# Patient Record
Sex: FEMALE | Race: BLACK OR AFRICAN AMERICAN | NOT HISPANIC OR LATINO | Employment: OTHER | URBAN - METROPOLITAN AREA
[De-identification: names, ages, dates, MRNs, and addresses within clinical notes are randomized per-mention and may not be internally consistent; named-entity substitution may affect disease eponyms.]

---

## 2019-06-04 ENCOUNTER — HISTORICAL (OUTPATIENT)
Dept: ADMINISTRATIVE | Facility: HOSPITAL | Age: 69
End: 2019-06-04

## 2019-06-04 LAB
ABS NEUT (OLG): 4.57 X10(3)/MCL (ref 2.1–9.2)
ALBUMIN SERPL-MCNC: 3.4 GM/DL (ref 3.4–5)
ALBUMIN/GLOB SERPL: 0.9 {RATIO}
ALP SERPL-CCNC: 124 UNIT/L (ref 38–126)
ALT SERPL-CCNC: 15 UNIT/L (ref 12–78)
AST SERPL-CCNC: 12 UNIT/L (ref 15–37)
BASOPHILS # BLD AUTO: 0.1 X10(3)/MCL (ref 0–0.2)
BASOPHILS NFR BLD AUTO: 1 %
BILIRUB SERPL-MCNC: 0.5 MG/DL (ref 0.2–1)
BILIRUBIN DIRECT+TOT PNL SERPL-MCNC: 0.1 MG/DL (ref 0–0.2)
BILIRUBIN DIRECT+TOT PNL SERPL-MCNC: 0.4 MG/DL (ref 0–0.8)
BUN SERPL-MCNC: 26 MG/DL (ref 7–18)
CALCIUM SERPL-MCNC: 9.5 MG/DL (ref 8.5–10.1)
CHLORIDE SERPL-SCNC: 110 MMOL/L (ref 98–107)
CHOLEST SERPL-MCNC: 126 MG/DL (ref 0–200)
CHOLEST/HDLC SERPL: 2.5 {RATIO} (ref 0–4)
CO2 SERPL-SCNC: 23 MMOL/L (ref 21–32)
CREAT SERPL-MCNC: 1.21 MG/DL (ref 0.55–1.02)
CREAT UR-MCNC: 88 MG/DL
EOSINOPHIL # BLD AUTO: 0.2 X10(3)/MCL (ref 0–0.9)
EOSINOPHIL NFR BLD AUTO: 2 %
ERYTHROCYTE [DISTWIDTH] IN BLOOD BY AUTOMATED COUNT: 13 % (ref 11.5–17)
EST. AVERAGE GLUCOSE BLD GHB EST-MCNC: 192 MG/DL
GLOBULIN SER-MCNC: 3.8 GM/DL (ref 2.4–3.5)
GLUCOSE SERPL-MCNC: 137 MG/DL (ref 74–106)
HBA1C MFR BLD: 8.3 % (ref 4.2–6.3)
HCT VFR BLD AUTO: 32.3 % (ref 37–47)
HDLC SERPL-MCNC: 50 MG/DL (ref 35–60)
HGB BLD-MCNC: 10.2 GM/DL (ref 12–16)
LDLC SERPL CALC-MCNC: 54 MG/DL (ref 0–129)
LYMPHOCYTES # BLD AUTO: 2.4 X10(3)/MCL (ref 0.6–4.6)
LYMPHOCYTES NFR BLD AUTO: 31 %
MCH RBC QN AUTO: 28.9 PG (ref 27–31)
MCHC RBC AUTO-ENTMCNC: 31.6 GM/DL (ref 33–36)
MCV RBC AUTO: 91.5 FL (ref 80–94)
MICROALBUMIN UR-MCNC: 3.6 MG/DL
MICROALBUMIN/CREAT RATIO PNL UR: 40.9 MG/GM CR (ref 0–30)
MONOCYTES # BLD AUTO: 0.5 X10(3)/MCL (ref 0.1–1.3)
MONOCYTES NFR BLD AUTO: 7 %
NEUTROPHILS # BLD AUTO: 4.57 X10(3)/MCL (ref 2.1–9.2)
NEUTROPHILS NFR BLD AUTO: 58 %
PLATELET # BLD AUTO: 236 X10(3)/MCL (ref 130–400)
PMV BLD AUTO: 9.7 FL (ref 9.4–12.4)
POTASSIUM SERPL-SCNC: 4.5 MMOL/L (ref 3.5–5.1)
PROT SERPL-MCNC: 7.2 GM/DL (ref 6.4–8.2)
RBC # BLD AUTO: 3.53 X10(6)/MCL (ref 4.2–5.4)
SODIUM SERPL-SCNC: 141 MMOL/L (ref 136–145)
TRIGL SERPL-MCNC: 111 MG/DL (ref 30–150)
TSH SERPL-ACNC: 1.17 MIU/L (ref 0.36–3.74)
VLDLC SERPL CALC-MCNC: 22 MG/DL
WBC # SPEC AUTO: 7.8 X10(3)/MCL (ref 4.5–11.5)

## 2019-09-04 ENCOUNTER — HISTORICAL (OUTPATIENT)
Dept: ADMINISTRATIVE | Facility: HOSPITAL | Age: 69
End: 2019-09-04

## 2019-09-04 LAB
ABS NEUT (OLG): 3.56 X10(3)/MCL (ref 2.1–9.2)
BASOPHILS # BLD AUTO: 0 X10(3)/MCL (ref 0–0.2)
BASOPHILS NFR BLD AUTO: 1 %
BUN SERPL-MCNC: 38 MG/DL (ref 7–18)
CALCIUM SERPL-MCNC: 9.6 MG/DL (ref 8.5–10.1)
CHLORIDE SERPL-SCNC: 109 MMOL/L (ref 98–107)
CO2 SERPL-SCNC: 29 MMOL/L (ref 21–32)
CREAT SERPL-MCNC: 1.5 MG/DL (ref 0.55–1.02)
CREAT/UREA NIT SERPL: 25.3
EOSINOPHIL # BLD AUTO: 0.1 X10(3)/MCL (ref 0–0.9)
EOSINOPHIL NFR BLD AUTO: 2 %
ERYTHROCYTE [DISTWIDTH] IN BLOOD BY AUTOMATED COUNT: 13.3 % (ref 11.5–17)
EST. AVERAGE GLUCOSE BLD GHB EST-MCNC: 148 MG/DL
FERRITIN SERPL-MCNC: 51.1 NG/ML (ref 8–388)
GLUCOSE SERPL-MCNC: 105 MG/DL (ref 74–106)
HBA1C MFR BLD: 6.8 % (ref 4.2–6.3)
HCT VFR BLD AUTO: 31.9 % (ref 37–47)
HGB BLD-MCNC: 9.7 GM/DL (ref 12–16)
IRON SATN MFR SERPL: 20.4 % (ref 20–50)
IRON SERPL-MCNC: 68 MCG/DL (ref 50–175)
LYMPHOCYTES # BLD AUTO: 2.3 X10(3)/MCL (ref 0.6–4.6)
LYMPHOCYTES NFR BLD AUTO: 35 %
MCH RBC QN AUTO: 29.8 PG (ref 27–31)
MCHC RBC AUTO-ENTMCNC: 30.4 GM/DL (ref 33–36)
MCV RBC AUTO: 98.2 FL (ref 80–94)
MONOCYTES # BLD AUTO: 0.6 X10(3)/MCL (ref 0.1–1.3)
MONOCYTES NFR BLD AUTO: 9 %
NEUTROPHILS # BLD AUTO: 3.56 X10(3)/MCL (ref 2.1–9.2)
NEUTROPHILS NFR BLD AUTO: 54 %
PLATELET # BLD AUTO: 255 X10(3)/MCL (ref 130–400)
PMV BLD AUTO: 9.8 FL (ref 9.4–12.4)
POTASSIUM SERPL-SCNC: 4.8 MMOL/L (ref 3.5–5.1)
RBC # BLD AUTO: 3.25 X10(6)/MCL (ref 4.2–5.4)
SODIUM SERPL-SCNC: 142 MMOL/L (ref 136–145)
TIBC SERPL-MCNC: 334 MCG/DL (ref 250–450)
TRANSFERRIN SERPL-MCNC: 241 MG/DL (ref 200–360)
VIT B12 SERPL-MCNC: 315 PG/ML (ref 193–986)
WBC # SPEC AUTO: 6.6 X10(3)/MCL (ref 4.5–11.5)

## 2019-09-27 ENCOUNTER — HISTORICAL (OUTPATIENT)
Dept: RADIOLOGY | Facility: HOSPITAL | Age: 69
End: 2019-09-27

## 2019-12-04 ENCOUNTER — HISTORICAL (OUTPATIENT)
Dept: ADMINISTRATIVE | Facility: HOSPITAL | Age: 69
End: 2019-12-04

## 2019-12-04 LAB
ABS NEUT (OLG): 4.21 X10(3)/MCL (ref 2.1–9.2)
BASOPHILS # BLD AUTO: 0.1 X10(3)/MCL (ref 0–0.2)
BASOPHILS NFR BLD AUTO: 1 %
BUN SERPL-MCNC: 30 MG/DL (ref 7–18)
CALCIUM SERPL-MCNC: 9.6 MG/DL (ref 8.5–10.1)
CHLORIDE SERPL-SCNC: 108 MMOL/L (ref 98–107)
CO2 SERPL-SCNC: 27 MMOL/L (ref 21–32)
CREAT SERPL-MCNC: 1.19 MG/DL (ref 0.55–1.02)
CREAT/UREA NIT SERPL: 25.2
DEPRECATED CALCIDIOL+CALCIFEROL SERPL-MC: 12.14 NG/ML (ref 30–80)
EOSINOPHIL # BLD AUTO: 0.2 X10(3)/MCL (ref 0–0.9)
EOSINOPHIL NFR BLD AUTO: 3 %
ERYTHROCYTE [DISTWIDTH] IN BLOOD BY AUTOMATED COUNT: 12.2 % (ref 11.5–17)
GLUCOSE SERPL-MCNC: 109 MG/DL (ref 74–106)
HCT VFR BLD AUTO: 35.8 % (ref 37–47)
HGB BLD-MCNC: 10.9 GM/DL (ref 12–16)
LYMPHOCYTES # BLD AUTO: 2.3 X10(3)/MCL (ref 0.6–4.6)
LYMPHOCYTES NFR BLD AUTO: 31 %
MCH RBC QN AUTO: 28.9 PG (ref 27–31)
MCHC RBC AUTO-ENTMCNC: 30.4 GM/DL (ref 33–36)
MCV RBC AUTO: 95 FL (ref 80–94)
MONOCYTES # BLD AUTO: 0.6 X10(3)/MCL (ref 0.1–1.3)
MONOCYTES NFR BLD AUTO: 8 %
NEUTROPHILS # BLD AUTO: 4.21 X10(3)/MCL (ref 2.1–9.2)
NEUTROPHILS NFR BLD AUTO: 57 %
PLATELET # BLD AUTO: 269 X10(3)/MCL (ref 130–400)
PMV BLD AUTO: 9.9 FL (ref 9.4–12.4)
POTASSIUM SERPL-SCNC: 5.2 MMOL/L (ref 3.5–5.1)
RBC # BLD AUTO: 3.77 X10(6)/MCL (ref 4.2–5.4)
SODIUM SERPL-SCNC: 139 MMOL/L (ref 136–145)
WBC # SPEC AUTO: 7.4 X10(3)/MCL (ref 4.5–11.5)

## 2020-01-23 ENCOUNTER — HISTORICAL (OUTPATIENT)
Dept: ADMINISTRATIVE | Facility: HOSPITAL | Age: 70
End: 2020-01-23

## 2020-06-03 ENCOUNTER — HISTORICAL (OUTPATIENT)
Dept: ADMINISTRATIVE | Facility: HOSPITAL | Age: 70
End: 2020-06-03

## 2020-06-03 LAB
ABS NEUT (OLG): 3.58 X10(3)/MCL (ref 2.1–9.2)
ALBUMIN SERPL-MCNC: 3.6 GM/DL (ref 3.4–4.8)
ALBUMIN/GLOB SERPL: 1.1 RATIO (ref 1.1–2)
ALP SERPL-CCNC: 102 UNIT/L (ref 40–150)
ALT SERPL-CCNC: 12 UNIT/L (ref 0–55)
AST SERPL-CCNC: 13 UNIT/L (ref 5–34)
BASOPHILS # BLD AUTO: 0.1 X10(3)/MCL (ref 0–0.2)
BASOPHILS NFR BLD AUTO: 1 %
BILIRUB SERPL-MCNC: 0.5 MG/DL
BILIRUBIN DIRECT+TOT PNL SERPL-MCNC: 0.2 MG/DL (ref 0–0.5)
BILIRUBIN DIRECT+TOT PNL SERPL-MCNC: 0.3 MG/DL (ref 0–0.8)
BUN SERPL-MCNC: 20 MG/DL (ref 9.8–20.1)
CALCIUM SERPL-MCNC: 9.6 MG/DL (ref 8.4–10.2)
CHLORIDE SERPL-SCNC: 109 MMOL/L (ref 98–107)
CHOLEST SERPL-MCNC: 150 MG/DL
CHOLEST/HDLC SERPL: 3 {RATIO} (ref 0–5)
CO2 SERPL-SCNC: 26 MMOL/L (ref 23–31)
CREAT SERPL-MCNC: 1.16 MG/DL (ref 0.55–1.02)
CREAT UR-MCNC: 97.8 MG/DL (ref 45–106)
DEPRECATED CALCIDIOL+CALCIFEROL SERPL-MC: 61.4 NG/ML (ref 6.6–49.9)
EOSINOPHIL # BLD AUTO: 0.2 X10(3)/MCL (ref 0–0.9)
EOSINOPHIL NFR BLD AUTO: 2 %
ERYTHROCYTE [DISTWIDTH] IN BLOOD BY AUTOMATED COUNT: 12.2 % (ref 11.5–17)
EST. AVERAGE GLUCOSE BLD GHB EST-MCNC: 174.3 MG/DL
GLOBULIN SER-MCNC: 3.4 GM/DL (ref 2.4–3.5)
GLUCOSE SERPL-MCNC: 123 MG/DL (ref 82–115)
HBA1C MFR BLD: 7.7 %
HCT VFR BLD AUTO: 36.5 % (ref 37–47)
HDLC SERPL-MCNC: 44 MG/DL (ref 35–60)
HGB BLD-MCNC: 11.2 GM/DL (ref 12–16)
LDLC SERPL CALC-MCNC: 87 MG/DL (ref 50–140)
LYMPHOCYTES # BLD AUTO: 2.6 X10(3)/MCL (ref 0.6–4.6)
LYMPHOCYTES NFR BLD AUTO: 37 %
MCH RBC QN AUTO: 29.2 PG (ref 27–31)
MCHC RBC AUTO-ENTMCNC: 30.7 GM/DL (ref 33–36)
MCV RBC AUTO: 95.1 FL (ref 80–94)
MICROALBUMIN UR-MCNC: 51.5 UG/ML
MICROALBUMIN/CREAT RATIO PNL UR: 52.7 MG/GM CR (ref 0–30)
MONOCYTES # BLD AUTO: 0.6 X10(3)/MCL (ref 0.1–1.3)
MONOCYTES NFR BLD AUTO: 8 %
NEUTROPHILS # BLD AUTO: 3.58 X10(3)/MCL (ref 2.1–9.2)
NEUTROPHILS NFR BLD AUTO: 51 %
PLATELET # BLD AUTO: 295 X10(3)/MCL (ref 130–400)
PMV BLD AUTO: 9.6 FL (ref 9.4–12.4)
POTASSIUM SERPL-SCNC: 5.1 MMOL/L (ref 3.5–5.1)
PROT SERPL-MCNC: 7 GM/DL (ref 5.8–7.6)
RBC # BLD AUTO: 3.84 X10(6)/MCL (ref 4.2–5.4)
SODIUM SERPL-SCNC: 142 MMOL/L (ref 136–145)
TRIGL SERPL-MCNC: 94 MG/DL (ref 37–140)
VLDLC SERPL CALC-MCNC: 19 MG/DL
WBC # SPEC AUTO: 7 X10(3)/MCL (ref 4.5–11.5)

## 2020-06-11 ENCOUNTER — HISTORICAL (OUTPATIENT)
Dept: ADMINISTRATIVE | Facility: HOSPITAL | Age: 70
End: 2020-06-11

## 2020-09-04 ENCOUNTER — HISTORICAL (OUTPATIENT)
Dept: ADMINISTRATIVE | Facility: HOSPITAL | Age: 70
End: 2020-09-04

## 2020-09-04 LAB
ABS NEUT (OLG): 3.61 X10(3)/MCL (ref 2.1–9.2)
BASOPHILS # BLD AUTO: 0.1 X10(3)/MCL (ref 0–0.2)
BASOPHILS NFR BLD AUTO: 1 %
BUN SERPL-MCNC: 19.2 MG/DL (ref 9.8–20.1)
CALCIUM SERPL-MCNC: 9.3 MG/DL (ref 8.4–10.2)
CHLORIDE SERPL-SCNC: 108 MMOL/L (ref 98–107)
CO2 SERPL-SCNC: 24 MMOL/L (ref 23–31)
CREAT SERPL-MCNC: 1.2 MG/DL (ref 0.55–1.02)
CREAT/UREA NIT SERPL: 16
EOSINOPHIL # BLD AUTO: 0.2 X10(3)/MCL (ref 0–0.9)
EOSINOPHIL NFR BLD AUTO: 2 %
ERYTHROCYTE [DISTWIDTH] IN BLOOD BY AUTOMATED COUNT: 13 % (ref 11.5–17)
EST. AVERAGE GLUCOSE BLD GHB EST-MCNC: 200.1 MG/DL
GLUCOSE SERPL-MCNC: 128 MG/DL (ref 82–115)
HBA1C MFR BLD: 8.6 %
HCT VFR BLD AUTO: 33.9 % (ref 37–47)
HGB BLD-MCNC: 10.6 GM/DL (ref 12–16)
LYMPHOCYTES # BLD AUTO: 2.3 X10(3)/MCL (ref 0.6–4.6)
LYMPHOCYTES NFR BLD AUTO: 34 %
MCH RBC QN AUTO: 29.4 PG (ref 27–31)
MCHC RBC AUTO-ENTMCNC: 31.3 GM/DL (ref 33–36)
MCV RBC AUTO: 93.9 FL (ref 80–94)
MONOCYTES # BLD AUTO: 0.6 X10(3)/MCL (ref 0.1–1.3)
MONOCYTES NFR BLD AUTO: 8 %
NEUTROPHILS # BLD AUTO: 3.61 X10(3)/MCL (ref 2.1–9.2)
NEUTROPHILS NFR BLD AUTO: 53 %
PLATELET # BLD AUTO: 257 X10(3)/MCL (ref 130–400)
PMV BLD AUTO: 10.2 FL (ref 9.4–12.4)
POTASSIUM SERPL-SCNC: 4.7 MMOL/L (ref 3.5–5.1)
RBC # BLD AUTO: 3.61 X10(6)/MCL (ref 4.2–5.4)
SODIUM SERPL-SCNC: 143 MMOL/L (ref 136–145)
WBC # SPEC AUTO: 6.8 X10(3)/MCL (ref 4.5–11.5)

## 2020-10-14 ENCOUNTER — HISTORICAL (OUTPATIENT)
Dept: RADIOLOGY | Facility: HOSPITAL | Age: 70
End: 2020-10-14

## 2022-04-10 ENCOUNTER — HISTORICAL (OUTPATIENT)
Dept: ADMINISTRATIVE | Facility: HOSPITAL | Age: 72
End: 2022-04-10

## 2022-04-27 VITALS
HEIGHT: 66 IN | DIASTOLIC BLOOD PRESSURE: 79 MMHG | BODY MASS INDEX: 26.4 KG/M2 | WEIGHT: 164.25 LBS | SYSTOLIC BLOOD PRESSURE: 134 MMHG | OXYGEN SATURATION: 100 %

## 2022-05-04 NOTE — HISTORICAL [facility] CERNER
This is a historical note converted from [place]. Formatting and pictures may have been removed.  Please reference [place] for original formatting and attached multimedia. Chief Complaint  New patient here with left shoulder pain. Poss RTC No injruy. Pain x1 week. Xrays today.  History of Present Illness  This is a patient that present today with shoulder pain. ?Patient has had this shoulder pain for several months. This shoulder pain is moderate to severe. Patient reports increased pain with overhead movement. Patient reports night pain. Patient reports anterolateral shoulder pain that radiates down the arm. Patient has been treated previously with medication, bracing, injections, therapy.? Those injections?wear off after a few weeks in the past.  Review of Systems  GENERAL: No fevers, chills, unexpected weight loss or gain  MUSCULOSKELETAL: Joint pain, extremity pain  Physical Exam  Vitals & Measurements  HR:?76(Peripheral)? BP:?174/82?  HT:?168?cm? WT:?73.85?kg? BMI:?26.17?  General: Well-developed, well-nourished.  Neuro: Alert and oriented x 3.  Psych: Normal mood and affect.  CV: Palpable radial pulses.  Resp: Smooth and unlabored.  Skin: No evidence of focal lesions or trauma.  Hem/Imm/Lymph: No evidence of lymphangitis or adenopathy.  Gait: No trendelenburg gait.  DTR: 2+, no hypo or hyperreflexia.  Coordination and Balance: No tremors or abnormal station.?  Shoulder Exam:  No obvious deformity. No medial or lateral scapula winging. Forward flexion to 140 degrees and abduction to 140 degrees. Positive empty can, ?positive Whipple, Positive drop arm test, Layne, impingement, Positive AC joint tenderness. Negative biceps groove tenderness, Positive Tsang?s, Yergason?s, Speed test. Negative Apprehension and Relocation test. 4/5 strength, normal skin appearance and palpable pulses distally. Sensibility normal.  Assessment/Plan  1.?Left rotator cuff tear?M75.102  ?This patients x-rays demonstrate some  calcifications in the rotator cuff tendon?and there is a possibility she may have a tear.? We discussed?proceeding with an MRI of her left shoulder. ?However?they will let me know if this is financially feasible for them.? If it is not we will proceed that she will come back and then we will review the MRI.? If it is not, then we will consider an?injection of her left shoulder.  Ordered:  diclofenac, 75 mg = 1 tab(s), Oral, BID, # 60 tab(s), 0 Refill(s), Pharmacy: [id number], 168, cm, Height/Length Dosing, 01/23/20 11:34:00 CST, 73.85, kg, Weight Dosing, 01/23/20 11:34:00 CST  MRI Ext Upper Joint Left W/O Contrast, Routine, 01/23/20 12:00:00 CST, Other (please specify), Shoulder trauma, rotator cuff tear suspected, neg xray, None, Ambulatory, [facility], Rad Type, Order for future visit, Left rotator cuff tear, Schedule this test, Outside Facility, 01/23/2...  Office/Outpatient Visit Level 3 [facility] [id number] PC, Left rotator cuff tear, Orthopa[facility]ics Clinic, 01/23/20 11:58:00 CST  ?   Problem List/Past Medical History  Ongoing  Diabetes mellitus type 2  HTN - Hypertension  Historical  No qualifying data  Procedure/Surgical History  Excision of gallbladder (1989)  Abdominal hysterectomy (1984)   Medications  ACCU-CHEK JR SMARTVIEW w/Device Kit, See Instructions  aspirin 81 mg oral Delayed Release (EC) tablet, 81 mg= 1 tab(s), Oral, Daily  carvedilol 25 mg oral tablet, 25 mg= 1 tab(s), Oral, BID, 3 refills  glipiZIDE 10 mg oral tablet, 10 mg= 1 tab(s), Oral, Daily, 3 refills  Glucometer, See Instructions  Glucose Testing Strips, See Instructions, 11 refills  Lancets, See Instructions, 11 refills  left Wrist splint, See Instructions  levocetirizine 5 mg oral tablet, 5 mg= 1 tab(s), Oral, qPM  lisinopril 40 mg oral tablet, 40 mg= 1 tab(s), Oral, Daily, 3 refills  Lumigan 0.01% ophthalmic solution, 1 drop(s), Eye-Both, qPM  meloxicam 7.5 mg oral tablet, 7.5 mg= 1 tab(s), Oral, Daily, 3 refills  metFORMIN 1000  mg oral tablet, 1000 mg= 1 tab(s), Oral, BID, 3 refills  pioglitazone 30 mg oral tablet, 30 mg= 1 tab(s), Oral, Daily, 3 refills  simvastatin 20 mg oral tablet, 20 mg= 1 tab(s), Oral, Once a day (at bedtime), 3 refills  Vitamin D 50,000 intl units oral capsule, 66983 IntUnit= 1 cap(s), Oral, 2x/Wk, 6 refills  Allergies  No Known Allergies  Social History  Abuse/Neglect  No, 01/23/2020  Alcohol  Current, 1-2 times per week, 01/21/2020  Employment/School  Retired, Work/School description: cook., 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  Heart disease: Mother.  Health Maintenance  Health Maintenance  ???Pending?(in the next year)  ??? ??OverDue  ??? ? ? ?Diabetes Maintenance-Urine Dipstick due??08/08/17??and every 1??year(s)  ??? ? ? ?Advance Directive due??01/01/20??and every 1??year(s)  ??? ??Due?  ??? ? ? ?Alcohol Misuse Screening due??01/01/20??and every 1??year(s)  ??? ? ? ?Cognitive Screening due??01/01/20??and every 1??year(s)  ??? ? ? ?Fall Risk Assessment due??01/01/20??and every 1??year(s)  ??? ? ? ?Functional Assessment due??01/01/20??and every 1??year(s)  ??? ? ? ?Bone Density Screening due??01/23/20??Variable frequency  ??? ? ? ?Hypertension Management-Education due??01/23/20??and every 1??year(s)  ??? ? ? ?Pneumococcal Vaccine due??01/23/20??Variable frequency  ??? ? ? ?Pneumococcal Vaccine due??01/23/20??and every?  ??? ? ? ?Tetanus Vaccine due??01/23/20??and every 10??year(s)  ??? ? ? ?Zoster Vaccine due??01/23/20??and every 100??year(s)  ??? ??Due In Future?  ??? ? ? ?Diabetes Maintenance-Foot Exam not due until??05/23/20??and every 1??year(s)  ??? ? ? ?Aspirin Therapy for CVD Prevention not due  until??05/24/20??and every 1??year(s)  ??? ? ? ?Diabetes Maintenance-Fasting Lipid Profile not due until??06/03/20??and every 1??year(s)  ??? ? ? ?Diabetes Maintenance-HgbA1c not due until??09/11/20??and every 1??year(s)  ??? ? ? ?Diabetes Maintenance-Eye Exam not due until??10/09/20??and every 1??year(s)  ??? ? ? ?Hypertension Management-BMP not due until??12/03/20??and every 1??year(s)  ??? ? ? ?Diabetes Maintenance-Serum Creatinine not due until??12/04/20??and every 1??year(s)  ??? ? ? ?Geriatric Depression Screening not due until??01/01/21??and every 1??year(s)  ??? ? ? ?Obesity Screening not due until??01/01/21??and every 1??year(s)  ??? ? ? ?ADL Screening not due until??01/21/21??and every 1??year(s)  ??? ? ? ?Hypertension Management-Blood Pressure not due until??01/22/21??and every 1??year(s)  ???Satisfied?(in the past 1 year)  ??? ??Satisfied?  ??? ? ? ?ADL Screening on??01/21/20.??Satisfied by Lynette Springer  ??? ? ? ?Alcohol Misuse Screening on??12/11/19.??Satisfied by Lynette Springer  ??? ? ? ?Aspirin Therapy for CVD Prevention on??05/24/19.  ??? ? ? ?Blood Pressure Screening on??01/23/20.??Satisfied by Kathryn Davis L. L.  ??? ? ? ?Body Mass Index Check on??01/23/20.??Satisfied by Kathryn Davis L. L.  ??? ? ? ?Breast Cancer Screening on??09/27/19.??Satisfied by Darien Moore  ??? ? ? ?Breast Cancer Screening ([facility]) on??09/27/19.??Satisfied by Darien Moore  ??? ? ? ?Colorectal Screening on??11/13/19.??Satisfied by Lynette Springer  ??? ? ? ?Colorectal Screening ([facility]) on??11/13/19.??Satisfied by Lynette Springer  ??? ? ? ?Depression Screening on??01/21/20.??Satisfied by Lynette Springer  ??? ? ? ?Diabetes Maintenance-Serum Creatinine on??12/04/19.??Satisfied by Bijal Tai  ??? ? ? ?Diabetes Maintenance-Eye Exam on??10/10/19.??Satisfied by Lise Pacheco  ??? ? ? ?Diabetes Maintenance-HgbA1c on??09/04/19.??Satisfied by Beau Daley  ??? ? ? ?Diabetes  Maintenance-Fasting Lipid Profile on??06/04/19.??Satisfied by Kayleigh Silva  ??? ? ? ?Diabetes Maintenance-Foot Exam on??05/24/19.??Satisfied by Jeremiah Schmidt MD  ??? ? ? ?Diabetes Screening on??12/04/19.??Satisfied by Bijal Tai  ??? ? ? ?Fall Risk Assessment on??05/24/19.??Satisfied by Fariha Fan LPN  ??? ? ? ?Geriatric Depression Screening on??01/21/20.??Satisfied by Lynette Springer  ??? ? ? ?Hypertension Management-Blood Pressure on??01/23/20.??Satisfied by Kathryn Davis  ??? ? ? ?Hypertension Management-BMP on??12/04/19.??Satisfied by Bijal Tai  ??? ? ? ?Lipid Screening on??06/04/19.??Satisfied by Kayleigh Silva  ??? ? ? ?Obesity Screening on??01/23/20.??Satisfied by Kathryn Davis  ?

## 2022-05-04 NOTE — HISTORICAL [facility] CERNER
This is a historical note converted from [employer]. Formatting and pictures may have been removed.  Please reference [place] for original formatting and attached multimedia. Chief Complaint  2 month f/u left shoulder, last steriod inj 2/6/2020...cv  History of Present Illness  This is a patient that present today with shoulder pain. ?Patient has had this shoulder pain for several months. This shoulder pain is moderate to severe. Patient reports increased pain with overhead movement. Patient reports night pain. Patient reports anterolateral shoulder pain that radiates down the arm. Patient has been treated previously with medication, bracing, injections, therapy.? Those injections?wear off after a few weeks in the past. [1]  2/6/2020 this patient was not able to get her left shoulder MRI?due to the expense.? She wants to get a steroid injection today. [1]  6/11/2020 this patient comes in today stating her left shoulder feels much better after her last steroid injection.? Her main issue today is with her left wrist over the?first dorsal compartment. ?She has a lot of pain that she is noticed over the last couple of months.? Pain is exacerbated by activity.? No associated trauma.  Review of Systems  GENERAL: No fevers, chills, unexpected weight loss or gain  MUSCULOSKELETAL: Joint pain, extremity pain [2]  Physical Exam  Vitals & Measurements  T:?97.4? ?F (Oral)? HR:?91(Peripheral)? BP:?175/77?  HT:?168?cm? WT:?74.55?kg? BMI:?26.41?  General: Well-developed, well-nourished.  Neuro: Alert and oriented x 3.  Psych: Normal mood and affect.  CV: Palpable radial pulses.  Resp: Smooth and unlabored.  Skin: No evidence of focal lesions or trauma.  Hem/Imm/Lymph: No evidence of lymphangitis or adenopathy.  Gait: No trendelenburg gait.  DTR: 2+, no hypo or hyperreflexia.  Coordination and Balance: No tremors or abnormal station.?  Wrist Exam:  No obvious deformity. ?Tenderness to palpation over the first dorsal compartment.  ?Negative tenderness over distal radius. Supination and pronation to 90 degrees and 90 degrees, respectively. Wrist flexion to 90 degrees and wrist extension to 70 degree. Negative flexion-compression test and Phanel?s test. ?Positive Finkelstein?s test. 5/5 strength, normal skin appearance and palpable pulses and CR<2.  Shoulder Exam:  No obvious deformity. No medial or lateral scapula winging. Forward flexion to 170 degrees and abduction to 170 degrees and external rotation 80 degrees is and internal rotation is 80 degrees. Negative empty can, Whipple, Drop Arm Test, Layne, impingement, AC joint tenderness. Negative biceps ?groove tenderness, Tsang?s, Yergason?s, Speed test. Negative Apprehension and Relocation test. 5/5 strength, normal skin appearance and palpable pulses distally. Sensibility normal.  Assessment/Plan  1.?Left rotator cuff tear?M75.102  ?We will continue to observe the shoulder.  Ordered:  betamethasone, 6 mg, Intra-Articular, Once, first dose 06/11/20 11:00:00 CDT, stop date 06/11/20 11:00:00 CDT  Lidocaine inj., 1 mL, Intra-Articular, Once, first dose 06/11/20 10:10:00 CDT, stop date 06/11/20 10:10:00 CDT  Clinic Follow up, *Est. 10/11/20 3:00:00 CDT, Order for future visit, Left rotator cuff tear  Left wrist tendinitis, Orthopaedics  Injections Tendon Sheath/lig [id number] PC, 06/11/20 10:10:00 CDT, Orthopa[facility]ics Clinic, Routine, 06/11/20 10:10:00 CDT, Left wrist tendinitis  Left rotator cuff tear  Office/Outpatient Visit Level 4 [id number], Left rotator cuff tear  Left wrist tendinitis, Orthopa[facility]ics Clinic, 06/11/20 10:10:00 CDT  Office/Outpatient Visit Level 4 [id number], Left rotator cuff tear  Left wrist pain, Orthopa[facility]ics Clinic, 06/11/20 9:51:00 CDT  ?  2.?Left wrist tendinitis?M77.8  ?I will give this patient a?first dorsal compartment?steroid injection.  [place] Injection:  The dorsal radial aspect of wrist was prepped and draped in normal sterile  fashion. A 25-gauge needle was placed just 1 cm proximal to the radial styloid into the first dorsal compartment. A solution of 0.5cc of 40mg depomedrol and 0.5cc of marcaine was injected into the carpal tunnel. Patient tolerated procedure well without any complications.  Ordered:  betamethasone, 6 mg, Intra-Articular, Once, first dose 06/11/20 11:00:00 CDT, stop date 06/11/20 11:00:00 CDT  Lidocaine inj., 1 mL, Intra-Articular, Once, first dose 06/11/20 10:10:00 CDT, stop date 06/11/20 10:10:00 CDT  Clinic Follow up, *Est. 10/11/20 3:00:00 CDT, Order for future visit, Left rotator cuff tear  Left wrist tendinitis, [facility]  Injections Tendon Sheath/lig [id number] PC, 06/11/20 10:10:00 CDT, Orthopa[facility]ics Clinic, Routine, 06/11/20 10:10:00 CDT, Left wrist tendinitis  Left rotator cuff tear  Office/Outpatient Visit Level 4 Establ[facility] [id number] PC, Left rotator cuff tear  Left wrist tendinitis, Orthopaedics Clinic, 06/11/20 10:10:00 CDT  ?  Orders:  XR Wrist Left Minimum 3 Views, Routine, 06/11/20 9:43:00 CDT, None, Ambulatory, Rad Type, Left wrist pain, Not Scheduled, 06/11/20 9:43:00 CDT  Referrals  Clinic Follow up, *Est. 10/11/20 3:00:00 CDT, Order for future visit, Left rotator cuff tear  Left wrist tendinitis, [facility]   Problem List/Past Medical History  Ongoing  Diabetes mellitus type 2  HTN - Hypertension  Left rotator cuff tear  Historical  No qualifying data  Procedure/Surgical History  Excision of gallbladder (1989)  Abdominal hysterectomy (1984)   Medications  ACCU-[employer] w/Device Kit, See Instructions  aspirin 81 mg oral Delayed Release (EC) tablet, 81 mg= 1 tab(s), Oral, Daily  carvedilol 25 mg oral tablet, 25 mg= 1 tab(s), Oral, BID, 3 refills  [place], 6 mg, Intra-Articular, Once  diclofenac sodium 75 mg oral delayed release tablet, 75 mg= 1 tab(s), Oral, BID  glipiZIDE 10 mg oral tablet, 10 mg= 1 tab(s), Oral, BID, 3 refills  Glucometer, See Instructions  Glucose Testing  Strips, See Instructions, 11 refills  Lancets, See Instructions, 11 refills  left Wrist splint, See Instructions  levocetirizine 5 mg oral tablet, 5 mg= 1 tab(s), Oral, qPM  lidocaine 2% injectable solution, 1 mL, Intra-Articular, Once  lisinopril 40 mg oral tablet, 40 mg= 1 tab(s), Oral, Daily, 3 refills  Lumigan 0.01% ophthalmic solution, 1 drop(s), Eye-Both, qPM  meloxicam 7.5 mg oral tablet, 7.5 mg= 1 tab(s), Oral, Daily, 3 refills  metFORMIN 1000 mg oral tablet, 1000 mg= 1 tab(s), Oral, BID, 3 refills  pioglitazone 30 mg oral tablet, 30 mg= 1 tab(s), Oral, Daily, 3 refills  simvastatin 20 mg oral tablet, 20 mg= 1 tab(s), Oral, Once a day (at bedtime), 3 refills  Allergies  No Known Allergies  Social History  Abuse/Neglect  No, No, Yes, 06/11/2020  Alcohol  Current, 1-2 times per month, 06/10/2020  Employment/School  Retired, Work/School description: [employer]., 06/14/2016  Exercise  Exercise type: Aerobics., 06/14/2016  Home/Environment  Lives with Spouse., 06/14/2016  Nutrition/Health  Regular, 06/14/2016  Sexual  Sexually active: Yes., 06/14/2016  Substance Use  Never, 06/14/2016  Tobacco  Never (less than 100 in lifetime), N/A, 06/11/2020  Family History  Diabetes mellitus type 2: Mother.  Heart disease: Mother.  Health Maintenance  Health Maintenance  ???Pending?(in the next year)  ??? ??OverDue  ??? ? ? ?Diabetes Maintenance-Urine Dipstick due??08/08/17??and every 1??year(s)  ??? ? ? ?Diabetes Maintenance-Foot Exam due??05/23/20??and every 1??year(s)  ??? ??Due?  ??? ? ? ?Aspirin Therapy for CVD Prevention due??05/24/20??and every 1??year(s)  ??? ? ? ?Bone Density Screening due??06/11/20??Variable frequency  ??? ? ? ?Hypertension Management-Education due??06/11/20??and every 1??year(s)  ??? ? ? ?Pneumococcal Vaccine due??06/11/20??Variable frequency  ??? ? ? ?Pneumococcal Vaccine due??06/11/20??and every?  ??? ? ? ?Tetanus Vaccine due??06/11/20??and every 10??year(s)  ??? ? ? ?Zoster Vaccine due??06/11/20??and  every 100??year(s)  ??? ??Due In Future?  ??? ? ? ?Diabetes Maintenance-Eye Exam not due until??10/09/20??and every 1??year(s)  ??? ? ? ?Advance Directive not due until??01/01/21??and every 1??year(s)  ??? ? ? ?Alcohol Misuse Screening not due until??01/01/21??and every 1??year(s)  ??? ? ? ?Cognitive Screening not due until??01/01/21??and every 1??year(s)  ??? ? ? ?Fall Risk Assessment not due until??01/01/21??and every 1??year(s)  ??? ? ? ?Functional Assessment not due until??01/01/21??and every 1??year(s)  ??? ? ? ?Obesity Screening not due until??01/01/21??and every 1??year(s)  ??? ? ? ?ADL Screening not due until??01/21/21??and every 1??year(s)  ??? ? ? ?Diabetes Maintenance-HgbA1c not due until??06/03/21??and every 1??year(s)  ??? ? ? ?Diabetes Maintenance-Fasting Lipid Profile not due until??06/03/21??and every 1??year(s)  ??? ? ? ?Hypertension Management-BMP not due until??06/03/21??and every 1??year(s)  ??? ? ? ?Diabetes Maintenance-Serum Creatinine not due until??06/03/21??and every 1??year(s)  ??? ? ? ?Hypertension Management-Blood Pressure not due until??06/10/21??and every 1??year(s)  ??? ? ? ?Smoking Cessation (Diabetes) not due until??06/10/21??and every 2??year(s)  ??? ? ? ?Medicare Annual Wellness Exam not due until??06/10/21??and every 1??year(s)  ???Satisfied?(in the past 1 year)  ??? ??Satisfied?  ??? ? ? ?ADL Screening on??01/21/20.??Satisfied by Lynette Springer  ??? ? ? ?Advance Directive on??06/10/20.??Satisfied by Lynette Springer  ??? ? ? ?Alcohol Misuse Screening on??06/10/20.??Satisfied by Lynette Springer  ??? ? ? ?Blood Pressure Screening on??06/11/20.??Satisfied by Verenice Shabazz LPN  ??? ? ? ?Body Mass Index Check on??06/11/20.??Satisfied by Verenice Shabazz LPN  ??? ? ? ?Breast Cancer Screening ([facility]) on??09/27/19.??Satisfied by Darien Moore  ??? ? ? ?Colorectal Screening ([facility]) on??11/13/19.??Satisfied by Lynette Springer  ??? ? ? ?Depression Screening  on??06/11/20.??Satisfied by Verenice Shabazz LPN  ??? ? ? ?Diabetes Maintenance-Serum Creatinine on??06/03/20.??Satisfied by Dahlia Brown  ??? ? ? ?Diabetes Screening on??06/03/20.??Satisfied by Dahlia Brown  ??? ? ? ?Fall Risk Assessment on??06/11/20.??Satisfied by Verenice Shabazz LPN  ??? ? ? ?Functional Assessment on??06/11/20.??Satisfied by Verenice Shabazz LPN  ??? ? ? ?Hypertension Management-Blood Pressure on??06/11/20.??Satisfied by Verenice Shabazz LPN  ??? ? ? ?Lipid Screening on??06/03/20.??Satisfied by Dahlia Brown  ??? ? ? ?Medicare Annual Wellness Exam on??06/10/20.??Satisfied by Jeremiah Schmidt MD  ??? ? ? ?Obesity Screening on??06/11/20.??Satisfied by Verenice Shabazz LPN  ?  Diagnostic Results  Patients wrist x-rays demonstrate some?osteolysis over the?distal?radial styloid.     [1]?Office Visit Note; Lauri Gordon MD 02/06/2020 10:48 CST  [2]?Office Visit Note; Lauri Gordon MD 02/06/2020 10:48 CST

## 2023-05-17 RX ORDER — DEXTROSE 4 G
1 TABLET,CHEWABLE ORAL DAILY
Qty: 300 EACH | Refills: 3 | Status: SHIPPED | OUTPATIENT
Start: 2023-05-17

## 2023-06-19 ENCOUNTER — PATIENT OUTREACH (OUTPATIENT)
Dept: ADMINISTRATIVE | Facility: HOSPITAL | Age: 73
End: 2023-06-19

## 2023-08-02 DIAGNOSIS — Z12.31 ENCOUNTER FOR SCREENING MAMMOGRAM FOR MALIGNANT NEOPLASM OF BREAST: Primary | ICD-10-CM

## 2023-08-03 DIAGNOSIS — I10 HYPERTENSION, UNSPECIFIED TYPE: Primary | ICD-10-CM

## 2023-08-03 RX ORDER — CARVEDILOL 25 MG/1
25 TABLET ORAL 2 TIMES DAILY
Qty: 180 TABLET | Refills: 3 | Status: SHIPPED | OUTPATIENT
Start: 2023-08-03

## 2023-08-23 ENCOUNTER — LAB VISIT (OUTPATIENT)
Dept: LAB | Facility: HOSPITAL | Age: 73
End: 2023-08-23
Attending: INTERNAL MEDICINE
Payer: MEDICARE

## 2023-08-23 DIAGNOSIS — M51.26 DISPLACEMENT OF LUMBAR INTERVERTEBRAL DISC WITHOUT MYELOPATHY: ICD-10-CM

## 2023-08-23 DIAGNOSIS — I10 ESSENTIAL HYPERTENSION, MALIGNANT: ICD-10-CM

## 2023-08-23 DIAGNOSIS — E11.42 DIABETIC POLYNEUROPATHY: ICD-10-CM

## 2023-08-23 DIAGNOSIS — Z00.01 ENCOUNTER FOR GENERAL ADULT MEDICAL EXAMINATION WITH ABNORMAL FINDINGS: Primary | ICD-10-CM

## 2023-08-23 DIAGNOSIS — G56.03 CARPAL TUNNEL SYNDROME, BILATERAL: ICD-10-CM

## 2023-08-23 DIAGNOSIS — E55.9 VITAMIN D DEFICIENCY: ICD-10-CM

## 2023-08-23 DIAGNOSIS — E78.49 FAMILIAL COMBINED HYPERLIPIDEMIA: ICD-10-CM

## 2023-08-23 DIAGNOSIS — J30.89 NON-SEASONAL ALLERGIC RHINITIS: ICD-10-CM

## 2023-08-23 LAB
ALBUMIN SERPL-MCNC: 3.8 G/DL (ref 3.4–4.8)
ALBUMIN/GLOB SERPL: 1 RATIO (ref 1.1–2)
ALP SERPL-CCNC: 92 UNIT/L (ref 40–150)
ALT SERPL-CCNC: 14 UNIT/L (ref 0–55)
AST SERPL-CCNC: 16 UNIT/L (ref 5–34)
BASOPHILS # BLD AUTO: 0.06 X10(3)/MCL
BASOPHILS NFR BLD AUTO: 0.8 %
BILIRUB SERPL-MCNC: 0.5 MG/DL
BUN SERPL-MCNC: 30.8 MG/DL (ref 9.8–20.1)
CALCIUM SERPL-MCNC: 10.5 MG/DL (ref 8.4–10.2)
CHLORIDE SERPL-SCNC: 108 MMOL/L (ref 98–107)
CHOLEST SERPL-MCNC: 131 MG/DL
CHOLEST/HDLC SERPL: 3 {RATIO} (ref 0–5)
CO2 SERPL-SCNC: 23 MMOL/L (ref 23–31)
CREAT SERPL-MCNC: 1.78 MG/DL (ref 0.55–1.02)
CREAT UR-MCNC: 107.1 MG/DL (ref 45–106)
DEPRECATED CALCIDIOL+CALCIFEROL SERPL-MC: 78.5 NG/ML (ref 30–80)
EOSINOPHIL # BLD AUTO: 0.18 X10(3)/MCL (ref 0–0.9)
EOSINOPHIL NFR BLD AUTO: 2.4 %
ERYTHROCYTE [DISTWIDTH] IN BLOOD BY AUTOMATED COUNT: 12.8 % (ref 11.5–17)
EST. AVERAGE GLUCOSE BLD GHB EST-MCNC: 194.4 MG/DL
GFR SERPLBLD CREATININE-BSD FMLA CKD-EPI: 30 MLS/MIN/1.73/M2
GLOBULIN SER-MCNC: 3.7 GM/DL (ref 2.4–3.5)
GLUCOSE SERPL-MCNC: 108 MG/DL (ref 82–115)
HBA1C MFR BLD: 8.4 %
HCT VFR BLD AUTO: 35.3 % (ref 37–47)
HDLC SERPL-MCNC: 41 MG/DL (ref 35–60)
HGB BLD-MCNC: 11.4 G/DL (ref 12–16)
IMM GRANULOCYTES # BLD AUTO: 0.02 X10(3)/MCL (ref 0–0.04)
IMM GRANULOCYTES NFR BLD AUTO: 0.3 %
LDLC SERPL CALC-MCNC: 69 MG/DL (ref 50–140)
LYMPHOCYTES # BLD AUTO: 2.02 X10(3)/MCL (ref 0.6–4.6)
LYMPHOCYTES NFR BLD AUTO: 27.3 %
MCH RBC QN AUTO: 29.7 PG (ref 27–31)
MCHC RBC AUTO-ENTMCNC: 32.3 G/DL (ref 33–36)
MCV RBC AUTO: 91.9 FL (ref 80–94)
MICROALBUMIN UR-MCNC: 36.5 UG/ML
MICROALBUMIN/CREAT RATIO PNL UR: 34.1 MG/GM CR (ref 0–30)
MONOCYTES # BLD AUTO: 0.52 X10(3)/MCL (ref 0.1–1.3)
MONOCYTES NFR BLD AUTO: 7 %
NEUTROPHILS # BLD AUTO: 4.59 X10(3)/MCL (ref 2.1–9.2)
NEUTROPHILS NFR BLD AUTO: 62.2 %
NRBC BLD AUTO-RTO: 0 %
PLATELET # BLD AUTO: 284 X10(3)/MCL (ref 130–400)
PMV BLD AUTO: 9.8 FL (ref 7.4–10.4)
POTASSIUM SERPL-SCNC: 5.6 MMOL/L (ref 3.5–5.1)
PROT SERPL-MCNC: 7.5 GM/DL (ref 5.8–7.6)
RBC # BLD AUTO: 3.84 X10(6)/MCL (ref 4.2–5.4)
SODIUM SERPL-SCNC: 141 MMOL/L (ref 136–145)
TRIGL SERPL-MCNC: 104 MG/DL (ref 37–140)
TSH SERPL-ACNC: 1.96 UIU/ML (ref 0.35–4.94)
VLDLC SERPL CALC-MCNC: 21 MG/DL
WBC # SPEC AUTO: 7.39 X10(3)/MCL (ref 4.5–11.5)

## 2023-08-23 PROCEDURE — 82043 UR ALBUMIN QUANTITATIVE: CPT

## 2023-08-23 PROCEDURE — 83036 HEMOGLOBIN GLYCOSYLATED A1C: CPT

## 2023-08-23 PROCEDURE — 84443 ASSAY THYROID STIM HORMONE: CPT

## 2023-08-23 PROCEDURE — 80061 LIPID PANEL: CPT

## 2023-08-23 PROCEDURE — 80053 COMPREHEN METABOLIC PANEL: CPT

## 2023-08-23 PROCEDURE — 36415 COLL VENOUS BLD VENIPUNCTURE: CPT

## 2023-08-23 PROCEDURE — 85025 COMPLETE CBC W/AUTO DIFF WBC: CPT

## 2023-08-23 PROCEDURE — 82306 VITAMIN D 25 HYDROXY: CPT

## 2023-10-16 NOTE — TELEPHONE ENCOUNTER
----- Message from Luma Arias sent at 10/16/2023  1:14 PM CDT -----  Regarding: refill  Type:  RX Refill Request    Who Called: christie  Refill or New Rx Refill  RX Name and Strength:metFORMIN (GLUCOPHAGE) 1000 MG tablet  How is the patient currently taking it? (ex. 1XDay):  Is this a 30 day or 90 day RX:  Preferred Pharmacy with phone number:[facility] or Mail Order:mail  Ordering Provider:car  Would the patient rather a call back or a response via [facility]? [email or web address]  Best Call Back Number:[phone]  Additional Information: pt needing a refill

## 2024-03-12 ENCOUNTER — LAB VISIT (OUTPATIENT)
Dept: LAB | Facility: HOSPITAL | Age: 74
End: 2024-03-12
Attending: INTERNAL MEDICINE
Payer: MEDICARE

## 2024-03-12 DIAGNOSIS — E11.42 DIABETIC POLYNEUROPATHY: Primary | ICD-10-CM

## 2024-03-12 LAB
EST. AVERAGE GLUCOSE BLD GHB EST-MCNC: ABNORMAL MG/DL
HBA1C MFR BLD: >14 %

## 2024-03-12 PROCEDURE — 83036 HEMOGLOBIN GLYCOSYLATED A1C: CPT

## 2024-03-12 PROCEDURE — 36415 COLL VENOUS BLD VENIPUNCTURE: CPT

## 2024-08-09 ENCOUNTER — LAB VISIT (OUTPATIENT)
Dept: LAB | Facility: HOSPITAL | Age: 74
End: 2024-08-09
Attending: INTERNAL MEDICINE
Payer: MEDICARE

## 2024-08-09 DIAGNOSIS — D63.1 ANEMIA OF CHRONIC RENAL FAILURE, UNSPECIFIED CKD STAGE: ICD-10-CM

## 2024-08-09 DIAGNOSIS — E07.9 DISEASE OF THYROID GLAND: ICD-10-CM

## 2024-08-09 DIAGNOSIS — E55.9 AVITAMINOSIS D: ICD-10-CM

## 2024-08-09 DIAGNOSIS — Z00.01 ENCOUNTER FOR GENERAL ADULT MEDICAL EXAMINATION WITH ABNORMAL FINDINGS: Primary | ICD-10-CM

## 2024-08-09 DIAGNOSIS — I95.89 CHRONIC HYPOTENSION: ICD-10-CM

## 2024-08-09 DIAGNOSIS — N18.32 CHRONIC KIDNEY DISEASE (CKD) STAGE G3B/A1, MODERATELY DECREASED GLOMERULAR FILTRATION RATE (GFR) BETWEEN 30-44 ML/MIN/1.73 SQUARE METER AND ALBUMINURIA CREATININE RATIO LESS THAN 30 MG/G: ICD-10-CM

## 2024-08-09 DIAGNOSIS — E78.49 FAMILIAL COMBINED HYPERLIPIDEMIA: ICD-10-CM

## 2024-08-09 DIAGNOSIS — E11.42 DIABETIC POLYNEUROPATHY: ICD-10-CM

## 2024-08-09 DIAGNOSIS — N18.9 ANEMIA OF CHRONIC RENAL FAILURE, UNSPECIFIED CKD STAGE: ICD-10-CM

## 2024-08-09 LAB
25(OH)D3+25(OH)D2 SERPL-MCNC: 56 NG/ML (ref 30–80)
ALBUMIN SERPL-MCNC: 3.6 G/DL (ref 3.4–4.8)
ALBUMIN/GLOB SERPL: 0.9 RATIO (ref 1.1–2)
ALP SERPL-CCNC: 122 UNIT/L (ref 40–150)
ALT SERPL-CCNC: 11 UNIT/L (ref 0–55)
ANION GAP SERPL CALC-SCNC: 6 MEQ/L
AST SERPL-CCNC: 15 UNIT/L (ref 5–34)
BILIRUB SERPL-MCNC: 0.4 MG/DL
BUN SERPL-MCNC: 59.7 MG/DL (ref 9.8–20.1)
CALCIUM SERPL-MCNC: 10 MG/DL (ref 8.4–10.2)
CHLORIDE SERPL-SCNC: 112 MMOL/L (ref 98–107)
CHOLEST SERPL-MCNC: 123 MG/DL
CHOLEST/HDLC SERPL: 3 {RATIO} (ref 0–5)
CO2 SERPL-SCNC: 22 MMOL/L (ref 23–31)
CREAT SERPL-MCNC: 2.06 MG/DL (ref 0.55–1.02)
CREAT/UREA NIT SERPL: 29
ERYTHROCYTE [DISTWIDTH] IN BLOOD BY AUTOMATED COUNT: 13.2 % (ref 11.5–17)
EST. AVERAGE GLUCOSE BLD GHB EST-MCNC: 148.5 MG/DL
GFR SERPLBLD CREATININE-BSD FMLA CKD-EPI: 25 ML/MIN/1.73/M2
GLOBULIN SER-MCNC: 4 GM/DL (ref 2.4–3.5)
GLUCOSE SERPL-MCNC: 114 MG/DL (ref 82–115)
HBA1C MFR BLD: 6.8 %
HCT VFR BLD AUTO: 36.7 % (ref 37–47)
HDLC SERPL-MCNC: 41 MG/DL (ref 35–60)
HGB BLD-MCNC: 11.4 G/DL (ref 12–16)
LDLC SERPL CALC-MCNC: 67 MG/DL (ref 50–140)
MCH RBC QN AUTO: 28.8 PG (ref 27–31)
MCHC RBC AUTO-ENTMCNC: 31.1 G/DL (ref 33–36)
MCV RBC AUTO: 92.7 FL (ref 80–94)
MICROALBUMIN UR-MCNC: 7.1 UG/ML
NRBC BLD AUTO-RTO: 0 %
PLATELET # BLD AUTO: 319 X10(3)/MCL (ref 130–400)
PMV BLD AUTO: 9.4 FL (ref 7.4–10.4)
POTASSIUM SERPL-SCNC: 5.2 MMOL/L (ref 3.5–5.1)
PROT SERPL-MCNC: 7.6 GM/DL (ref 5.8–7.6)
RBC # BLD AUTO: 3.96 X10(6)/MCL (ref 4.2–5.4)
SODIUM SERPL-SCNC: 140 MMOL/L (ref 136–145)
TRIGL SERPL-MCNC: 75 MG/DL (ref 37–140)
TSH SERPL-ACNC: 1.7 UIU/ML (ref 0.35–4.94)
VLDLC SERPL CALC-MCNC: 15 MG/DL
WBC # BLD AUTO: 6.51 X10(3)/MCL (ref 4.5–11.5)

## 2024-08-09 PROCEDURE — 85027 COMPLETE CBC AUTOMATED: CPT

## 2024-08-09 PROCEDURE — 82043 UR ALBUMIN QUANTITATIVE: CPT

## 2024-08-09 PROCEDURE — 80061 LIPID PANEL: CPT

## 2024-08-09 PROCEDURE — 36415 COLL VENOUS BLD VENIPUNCTURE: CPT

## 2024-08-09 PROCEDURE — 82306 VITAMIN D 25 HYDROXY: CPT

## 2024-08-09 PROCEDURE — 83036 HEMOGLOBIN GLYCOSYLATED A1C: CPT

## 2024-08-09 PROCEDURE — 80053 COMPREHEN METABOLIC PANEL: CPT

## 2024-08-09 PROCEDURE — 84443 ASSAY THYROID STIM HORMONE: CPT

## 2025-02-18 ENCOUNTER — LAB VISIT (OUTPATIENT)
Dept: LAB | Facility: HOSPITAL | Age: 75
End: 2025-02-18
Attending: INTERNAL MEDICINE
Payer: MEDICARE

## 2025-02-18 DIAGNOSIS — E11.42 DIABETIC POLYNEUROPATHY: Primary | ICD-10-CM

## 2025-02-18 LAB
EST. AVERAGE GLUCOSE BLD GHB EST-MCNC: 168.6 MG/DL
HBA1C MFR BLD: 7.5 %

## 2025-02-18 PROCEDURE — 83036 HEMOGLOBIN GLYCOSYLATED A1C: CPT

## 2025-02-18 PROCEDURE — 36415 COLL VENOUS BLD VENIPUNCTURE: CPT

## 2025-04-23 ENCOUNTER — LAB VISIT (OUTPATIENT)
Dept: LAB | Facility: HOSPITAL | Age: 75
End: 2025-04-23
Attending: INTERNAL MEDICINE
Payer: MEDICARE

## 2025-04-23 DIAGNOSIS — I10 ESSENTIAL HYPERTENSION, MALIGNANT: ICD-10-CM

## 2025-04-23 DIAGNOSIS — N18.9 ANEMIA OF CHRONIC RENAL FAILURE, UNSPECIFIED CKD STAGE: ICD-10-CM

## 2025-04-23 DIAGNOSIS — E55.9 AVITAMINOSIS D: ICD-10-CM

## 2025-04-23 DIAGNOSIS — N18.32 CHRONIC KIDNEY DISEASE (CKD) STAGE G3B/A1, MODERATELY DECREASED GLOMERULAR FILTRATION RATE (GFR) BETWEEN 30-44 ML/MIN/1.73 SQUARE METER AND ALBUMINURIA CREATININE RATIO LESS THAN 30 MG/G: ICD-10-CM

## 2025-04-23 DIAGNOSIS — Z00.01 ENCOUNTER FOR GENERAL ADULT MEDICAL EXAMINATION WITH ABNORMAL FINDINGS: ICD-10-CM

## 2025-04-23 DIAGNOSIS — E78.49 FAMILIAL COMBINED HYPERLIPIDEMIA: ICD-10-CM

## 2025-04-23 DIAGNOSIS — D63.1 ANEMIA OF CHRONIC RENAL FAILURE, UNSPECIFIED CKD STAGE: ICD-10-CM

## 2025-04-23 DIAGNOSIS — E11.42 DIABETIC POLYNEUROPATHY: ICD-10-CM

## 2025-04-23 DIAGNOSIS — I95.89 CHRONIC HYPOTENSION: ICD-10-CM

## 2025-04-23 DIAGNOSIS — N18.9 CHRONIC KIDNEY DISEASE, UNSPECIFIED: Primary | ICD-10-CM

## 2025-04-23 DIAGNOSIS — D83.1 PREDOMINANTLY T-CELL DEFECT: ICD-10-CM

## 2025-04-23 DIAGNOSIS — E07.9 DISEASE OF THYROID GLAND: ICD-10-CM

## 2025-04-23 LAB
25(OH)D3+25(OH)D2 SERPL-MCNC: 36 NG/ML (ref 30–80)
ALBUMIN SERPL-MCNC: 3.6 G/DL (ref 3.4–4.8)
ALBUMIN/GLOB SERPL: 0.8 RATIO (ref 1.1–2)
ALP SERPL-CCNC: 174 UNIT/L (ref 40–150)
ALT SERPL-CCNC: 17 UNIT/L (ref 0–55)
ANION GAP SERPL CALC-SCNC: 7 MEQ/L
AST SERPL-CCNC: 16 UNIT/L (ref 11–45)
BACTERIA #/AREA URNS AUTO: ABNORMAL /HPF
BASOPHILS # BLD AUTO: 0.09 X10(3)/MCL
BASOPHILS NFR BLD AUTO: 1.2 %
BILIRUB SERPL-MCNC: 0.4 MG/DL
BILIRUB UR QL STRIP.AUTO: NEGATIVE
BUN SERPL-MCNC: 40.7 MG/DL (ref 9.8–20.1)
CALCIUM SERPL-MCNC: 9.7 MG/DL (ref 8.4–10.2)
CHLORIDE SERPL-SCNC: 110 MMOL/L (ref 98–107)
CLARITY UR: CLEAR
CO2 SERPL-SCNC: 24 MMOL/L (ref 23–31)
COLOR UR AUTO: COLORLESS
CREAT SERPL-MCNC: 1.48 MG/DL (ref 0.55–1.02)
CREAT UR-MCNC: 70.6 MG/DL (ref 45–106)
CREAT/UREA NIT SERPL: 28
EOSINOPHIL # BLD AUTO: 0.2 X10(3)/MCL (ref 0–0.9)
EOSINOPHIL NFR BLD AUTO: 2.6 %
ERYTHROCYTE [DISTWIDTH] IN BLOOD BY AUTOMATED COUNT: 11.9 % (ref 11.5–17)
GFR SERPLBLD CREATININE-BSD FMLA CKD-EPI: 37 ML/MIN/1.73/M2
GLOBULIN SER-MCNC: 4.4 GM/DL (ref 2.4–3.5)
GLUCOSE SERPL-MCNC: 134 MG/DL (ref 82–115)
GLUCOSE UR QL STRIP: ABNORMAL
HCT VFR BLD AUTO: 42.6 % (ref 37–47)
HGB BLD-MCNC: 13.5 G/DL (ref 12–16)
HGB UR QL STRIP: NEGATIVE
HYALINE CASTS #/AREA URNS LPF: ABNORMAL /LPF
IMM GRANULOCYTES # BLD AUTO: 0.03 X10(3)/MCL (ref 0–0.04)
IMM GRANULOCYTES NFR BLD AUTO: 0.4 %
KETONES UR QL STRIP: NEGATIVE
LEUKOCYTE ESTERASE UR QL STRIP: NEGATIVE
LYMPHOCYTES # BLD AUTO: 2.85 X10(3)/MCL (ref 0.6–4.6)
LYMPHOCYTES NFR BLD AUTO: 36.7 %
MAGNESIUM SERPL-MCNC: 2.4 MG/DL (ref 1.6–2.6)
MCH RBC QN AUTO: 29.3 PG (ref 27–31)
MCHC RBC AUTO-ENTMCNC: 31.7 G/DL (ref 33–36)
MCV RBC AUTO: 92.4 FL (ref 80–94)
MONOCYTES # BLD AUTO: 0.79 X10(3)/MCL (ref 0.1–1.3)
MONOCYTES NFR BLD AUTO: 10.2 %
NEUTROPHILS # BLD AUTO: 3.8 X10(3)/MCL (ref 2.1–9.2)
NEUTROPHILS NFR BLD AUTO: 48.9 %
NITRITE UR QL STRIP: NEGATIVE
NRBC BLD AUTO-RTO: 0 %
PH UR STRIP: 5.5 [PH]
PHOSPHATE SERPL-MCNC: 3.6 MG/DL (ref 2.3–4.7)
PLATELET # BLD AUTO: 288 X10(3)/MCL (ref 130–400)
PMV BLD AUTO: 9.5 FL (ref 7.4–10.4)
POTASSIUM SERPL-SCNC: 4.7 MMOL/L (ref 3.5–5.1)
PROT SERPL-MCNC: 8 GM/DL (ref 5.8–7.6)
PROT UR QL STRIP: ABNORMAL
PROT UR STRIP-MCNC: 20.6 MG/DL
PTH-INTACT SERPL-MCNC: 115.7 PG/ML (ref 8.7–77)
RBC # BLD AUTO: 4.61 X10(6)/MCL (ref 4.2–5.4)
RBC #/AREA URNS AUTO: ABNORMAL /HPF
SODIUM SERPL-SCNC: 141 MMOL/L (ref 136–145)
SP GR UR STRIP.AUTO: 1.02 (ref 1–1.03)
SQUAMOUS #/AREA URNS LPF: ABNORMAL /HPF
TSH SERPL-ACNC: 2.14 UIU/ML (ref 0.35–4.94)
URINE PROTEIN/CREATININE RATIO (OLG): 0.3
UROBILINOGEN UR STRIP-ACNC: NORMAL
WBC # BLD AUTO: 7.76 X10(3)/MCL (ref 4.5–11.5)
WBC #/AREA URNS AUTO: ABNORMAL /HPF

## 2025-04-23 PROCEDURE — 81015 MICROSCOPIC EXAM OF URINE: CPT

## 2025-04-23 PROCEDURE — 83970 ASSAY OF PARATHORMONE: CPT

## 2025-04-23 PROCEDURE — 85025 COMPLETE CBC W/AUTO DIFF WBC: CPT

## 2025-04-23 PROCEDURE — 36415 COLL VENOUS BLD VENIPUNCTURE: CPT

## 2025-04-23 PROCEDURE — 84100 ASSAY OF PHOSPHORUS: CPT

## 2025-04-23 PROCEDURE — 84156 ASSAY OF PROTEIN URINE: CPT

## 2025-04-23 PROCEDURE — 80053 COMPREHEN METABOLIC PANEL: CPT

## 2025-04-23 PROCEDURE — 84443 ASSAY THYROID STIM HORMONE: CPT

## 2025-04-23 PROCEDURE — 83735 ASSAY OF MAGNESIUM: CPT

## 2025-04-23 PROCEDURE — 82306 VITAMIN D 25 HYDROXY: CPT

## 2025-08-19 ENCOUNTER — LAB VISIT (OUTPATIENT)
Dept: LAB | Facility: HOSPITAL | Age: 75
End: 2025-08-19
Attending: INTERNAL MEDICINE
Payer: MEDICARE

## 2025-08-19 DIAGNOSIS — N18.6 TYPE 2 DIABETES MELLITUS WITH END-STAGE RENAL DISEASE: ICD-10-CM

## 2025-08-19 DIAGNOSIS — D63.1 ANEMIA OF CHRONIC RENAL FAILURE, UNSPECIFIED CKD STAGE: ICD-10-CM

## 2025-08-19 DIAGNOSIS — N18.4 CHRONIC KIDNEY DISEASE, STAGE IV (SEVERE): ICD-10-CM

## 2025-08-19 DIAGNOSIS — E78.49 FAMILIAL COMBINED HYPERLIPIDEMIA: ICD-10-CM

## 2025-08-19 DIAGNOSIS — Z00.00 ROUTINE GENERAL MEDICAL EXAMINATION AT A HEALTH CARE FACILITY: Primary | ICD-10-CM

## 2025-08-19 DIAGNOSIS — E11.42 DIABETIC POLYNEUROPATHY: ICD-10-CM

## 2025-08-19 DIAGNOSIS — Z12.31 ENCOUNTER FOR SCREENING MAMMOGRAM FOR MALIGNANT NEOPLASM OF BREAST: Primary | ICD-10-CM

## 2025-08-19 DIAGNOSIS — E55.9 AVITAMINOSIS D: ICD-10-CM

## 2025-08-19 DIAGNOSIS — N18.9 ANEMIA OF CHRONIC RENAL FAILURE, UNSPECIFIED CKD STAGE: ICD-10-CM

## 2025-08-19 DIAGNOSIS — I10 HYPERTENSION, UNSPECIFIED TYPE: ICD-10-CM

## 2025-08-19 DIAGNOSIS — E07.9 DISEASE OF THYROID GLAND: ICD-10-CM

## 2025-08-19 DIAGNOSIS — E11.22 TYPE 2 DIABETES MELLITUS WITH END-STAGE RENAL DISEASE: ICD-10-CM

## 2025-08-19 LAB
25(OH)D3+25(OH)D2 SERPL-MCNC: 34 NG/ML (ref 30–80)
ALBUMIN SERPL-MCNC: 3.5 G/DL (ref 3.4–4.8)
ALBUMIN/CREAT UR: 177.3 MG/GM CR (ref 0–30)
ALBUMIN/GLOB SERPL: 0.7 RATIO (ref 1.1–2)
ALP SERPL-CCNC: 173 UNIT/L (ref 40–150)
ALT SERPL-CCNC: 12 UNIT/L (ref 0–55)
ANION GAP SERPL CALC-SCNC: 5 MEQ/L
AST SERPL-CCNC: 19 UNIT/L (ref 11–45)
BASOPHILS # BLD AUTO: 0.08 X10(3)/MCL
BASOPHILS NFR BLD AUTO: 0.9 %
BILIRUB SERPL-MCNC: 0.5 MG/DL
BUN SERPL-MCNC: 42 MG/DL (ref 9.8–20.1)
CALCIUM SERPL-MCNC: 9.9 MG/DL (ref 8.4–10.2)
CHLORIDE SERPL-SCNC: 111 MMOL/L (ref 98–107)
CHOLEST SERPL-MCNC: 139 MG/DL
CHOLEST/HDLC SERPL: 3 {RATIO} (ref 0–5)
CO2 SERPL-SCNC: 24 MMOL/L (ref 23–31)
CREAT SERPL-MCNC: 1.64 MG/DL (ref 0.55–1.02)
CREAT UR-MCNC: 86.5 MG/DL (ref 45–106)
CREAT/UREA NIT SERPL: 26
EOSINOPHIL # BLD AUTO: 0.27 X10(3)/MCL (ref 0–0.9)
EOSINOPHIL NFR BLD AUTO: 3 %
ERYTHROCYTE [DISTWIDTH] IN BLOOD BY AUTOMATED COUNT: 12.3 % (ref 11.5–17)
EST. AVERAGE GLUCOSE BLD GHB EST-MCNC: 177.2 MG/DL
GFR SERPLBLD CREATININE-BSD FMLA CKD-EPI: 33 ML/MIN/1.73/M2
GLOBULIN SER-MCNC: 4.7 GM/DL (ref 2.4–3.5)
GLUCOSE SERPL-MCNC: 102 MG/DL (ref 82–115)
HBA1C MFR BLD: 7.8 %
HCT VFR BLD AUTO: 43.9 % (ref 37–47)
HDLC SERPL-MCNC: 51 MG/DL (ref 35–60)
HGB BLD-MCNC: 13.7 G/DL (ref 12–16)
IMM GRANULOCYTES # BLD AUTO: 0.05 X10(3)/MCL (ref 0–0.04)
IMM GRANULOCYTES NFR BLD AUTO: 0.5 %
LDLC SERPL CALC-MCNC: 74 MG/DL (ref 50–140)
LYMPHOCYTES # BLD AUTO: 2.87 X10(3)/MCL (ref 0.6–4.6)
LYMPHOCYTES NFR BLD AUTO: 31.5 %
MCH RBC QN AUTO: 29.3 PG (ref 27–31)
MCHC RBC AUTO-ENTMCNC: 31.2 G/DL (ref 33–36)
MCV RBC AUTO: 93.8 FL (ref 80–94)
MICROALBUMIN UR-MCNC: 153.4 UG/ML
MONOCYTES # BLD AUTO: 0.73 X10(3)/MCL (ref 0.1–1.3)
MONOCYTES NFR BLD AUTO: 8 %
NEUTROPHILS # BLD AUTO: 5.1 X10(3)/MCL (ref 2.1–9.2)
NEUTROPHILS NFR BLD AUTO: 56.1 %
NRBC BLD AUTO-RTO: 0 %
PLATELET # BLD AUTO: 296 X10(3)/MCL (ref 130–400)
PMV BLD AUTO: 9.3 FL (ref 7.4–10.4)
POTASSIUM SERPL-SCNC: 4.5 MMOL/L (ref 3.5–5.1)
PROT SERPL-MCNC: 8.2 GM/DL (ref 5.8–7.6)
RBC # BLD AUTO: 4.68 X10(6)/MCL (ref 4.2–5.4)
SODIUM SERPL-SCNC: 140 MMOL/L (ref 136–145)
TRIGL SERPL-MCNC: 69 MG/DL (ref 37–140)
TSH SERPL-ACNC: 1.37 UIU/ML (ref 0.35–4.94)
VLDLC SERPL CALC-MCNC: 14 MG/DL
WBC # BLD AUTO: 9.1 X10(3)/MCL (ref 4.5–11.5)

## 2025-08-19 PROCEDURE — 84443 ASSAY THYROID STIM HORMONE: CPT

## 2025-08-19 PROCEDURE — 85025 COMPLETE CBC W/AUTO DIFF WBC: CPT

## 2025-08-19 PROCEDURE — 80061 LIPID PANEL: CPT

## 2025-08-19 PROCEDURE — 82570 ASSAY OF URINE CREATININE: CPT

## 2025-08-19 PROCEDURE — 36415 COLL VENOUS BLD VENIPUNCTURE: CPT

## 2025-08-19 PROCEDURE — 80053 COMPREHEN METABOLIC PANEL: CPT

## 2025-08-19 PROCEDURE — 83036 HEMOGLOBIN GLYCOSYLATED A1C: CPT

## 2025-08-19 PROCEDURE — 82306 VITAMIN D 25 HYDROXY: CPT
